# Patient Record
Sex: FEMALE | Employment: FULL TIME | URBAN - METROPOLITAN AREA
[De-identification: names, ages, dates, MRNs, and addresses within clinical notes are randomized per-mention and may not be internally consistent; named-entity substitution may affect disease eponyms.]

---

## 2018-01-29 ENCOUNTER — OFFICE VISIT (OUTPATIENT)
Dept: URGENT CARE | Facility: CLINIC | Age: 48
End: 2018-01-29
Payer: COMMERCIAL

## 2018-01-29 VITALS
HEART RATE: 72 BPM | WEIGHT: 155 LBS | BODY MASS INDEX: 24.91 KG/M2 | SYSTOLIC BLOOD PRESSURE: 153 MMHG | HEIGHT: 66 IN | TEMPERATURE: 99 F | RESPIRATION RATE: 18 BRPM | OXYGEN SATURATION: 100 % | DIASTOLIC BLOOD PRESSURE: 80 MMHG

## 2018-01-29 DIAGNOSIS — R73.9 BLOOD GLUCOSE ELEVATED: ICD-10-CM

## 2018-01-29 DIAGNOSIS — R40.20 LOSS OF CONSCIOUSNESS: Primary | ICD-10-CM

## 2018-01-29 LAB
BILIRUB UR QL STRIP: NEGATIVE
GLUCOSE SERPL-MCNC: 123 MG/DL (ref 70–110)
GLUCOSE UR QL STRIP: NEGATIVE
KETONES UR QL STRIP: NEGATIVE
LEUKOCYTE ESTERASE UR QL STRIP: NEGATIVE
PH, POC UA: 5.5 (ref 5–8)
POC ANION GAP: 16 MMOL/L (ref 10–20)
POC BLOOD, URINE: POSITIVE
POC BUN: 15 MMOL/L (ref 8–26)
POC CHLORIDE: 103 MMOL/L (ref 98–109)
POC CREATININE: 0.7 MG/DL (ref 0.6–1.3)
POC HEMATOCRIT: 43 %PCV (ref 37–47)
POC HEMOGLOBIN: 14.6 G/DL (ref 12.5–16)
POC ICA: 1.15 MMOL/L (ref 1.12–1.32)
POC NITRATES, URINE: NEGATIVE
POC POTASSIUM: 4.2 MMOL/L (ref 3.5–4.9)
POC SODIUM: 137 MMOL/L (ref 138–146)
POC TCO2: 24 MMOL/L (ref 24–29)
PROT UR QL STRIP: NEGATIVE
SP GR UR STRIP: 1.01 (ref 1–1.03)
UROBILINOGEN UR STRIP-ACNC: NORMAL (ref 0.1–1.1)

## 2018-01-29 PROCEDURE — 81003 URINALYSIS AUTO W/O SCOPE: CPT | Mod: QW,S$GLB,, | Performed by: EMERGENCY MEDICINE

## 2018-01-29 PROCEDURE — 99203 OFFICE O/P NEW LOW 30 MIN: CPT | Mod: 25,S$GLB,, | Performed by: EMERGENCY MEDICINE

## 2018-01-29 PROCEDURE — 80047 BASIC METABLC PNL IONIZED CA: CPT | Mod: QW,S$GLB,, | Performed by: EMERGENCY MEDICINE

## 2018-01-29 NOTE — PROGRESS NOTES
"Subjective:       Patient ID: Liz Barragan is a 47 y.o. female.    Vitals:  height is 5' 6" (1.676 m) and weight is 70.3 kg (155 lb). Her oral temperature is 98.6 °F (37 °C). Her blood pressure is 153/80 (abnormal) and her pulse is 72. Her respiration is 18 and oxygen saturation is 100%.     Chief Complaint: Dizziness    PATIENT PRESENTS TODAY WITH C/O PASSING OUT 2 DAYS AGO. PATIENT STATES THAT SHE WAS AT A [employer] Saturday NIGHT AND BEGAN TO FEEL HOT. SHE THEN PASSED OUT. SHE DENIES HITTING HER HEAD. SHE STATES THAT SHE DID LOSE CONSCIOUSNESS FOR LESS THAN 20 SEC. DENIES CHEST PAIN, SHORTNESS OF BREATH, BLURRED VISION, NUMBNESS OR TINGLING IN EXTREMITIES, COUGH, CONGESTION, FEVER, ABDOMINAL PAIN, N/V/D.     PATIENT REPORTS A HISTORY OF A HEART MURMUR. SHE WAS DIAGNOSED AT 26 YEARS OLD. SHE IS NO LONGER FOLLOWED FOR THIS. SHE IS UNSURE ABOUT WHAT TYPE OF MURMUR OF WHAT CAUSED It. THE MURMUR WAS FOUND BY HER PCP ON HEALTH SCREEN.      PATIENT STATES THAT SHE HAS FELT FINE SINCE Saturday. SHE WENT TO THE GYM YESTERDAY.      Dizziness:   Chronicity:  New  Onset:  In the past 7 days  Progression since onset:  Rapidly improving  Frequency - weeks/days included: Rear.  Pain Scale:  0/10  Severity:  Mild  Duration:  5 minutes  Dizziness characteristics:  Blacking out  Time frame: Rear.no fever, no headaches, no nausea, no vomiting and no chest pain.  Aggravated by:  Nothing  Risk factors:  Family history of inner ear  Treatments tried:  Nothing  Improvement on treatment: No treatment.    Review of Systems   Constitution: Negative for chills and fever.   HENT: Negative for sore throat.    Eyes: Negative for blurred vision.   Cardiovascular: Negative for chest pain.   Respiratory: Negative for shortness of breath.    Skin: Negative for rash.   Musculoskeletal: Negative for back pain and joint pain.   Gastrointestinal: Negative for abdominal pain, diarrhea, nausea and vomiting.   Neurological: Positive for dizziness. " Negative for headaches.   Psychiatric/Behavioral: The patient is not nervous/anxious.        Objective:      Physical Exam   Constitutional: She is oriented to person, place, and time. Vital signs are normal. She appears well-developed and well-nourished. She is cooperative.  Non-toxic appearance. She does not appear ill. No distress.   NAD     HENT:   Head: Normocephalic and atraumatic.   Right Ear: Hearing, tympanic membrane, external ear and ear canal normal.   Left Ear: Hearing, tympanic membrane, external ear and ear canal normal.   Nose: Nose normal. No mucosal edema, rhinorrhea or nasal deformity. No epistaxis. Right sinus exhibits no maxillary sinus tenderness and no frontal sinus tenderness. Left sinus exhibits no maxillary sinus tenderness and no frontal sinus tenderness.   Mouth/Throat: Uvula is midline, oropharynx is clear and moist and mucous membranes are normal. No trismus in the jaw. Normal dentition. No uvula swelling. No posterior oropharyngeal erythema.   Eyes: Conjunctivae and lids are normal. Right eye exhibits no discharge. Left eye exhibits no discharge. No scleral icterus.   Sclera clear bilat   Neck: Trachea normal, normal range of motion, full passive range of motion without pain and phonation normal. Neck supple.   Cardiovascular: Normal rate, regular rhythm, normal heart sounds, intact distal pulses and normal pulses.    No murmur heard.  Pulmonary/Chest: Effort normal and breath sounds normal. No respiratory distress.   Abdominal: Soft. Normal appearance and bowel sounds are normal. She exhibits no distension, no pulsatile midline mass and no mass. There is no tenderness.   Musculoskeletal: Normal range of motion. She exhibits no edema or deformity.   Neurological: She is alert and oriented to person, place, and time. She exhibits normal muscle tone. Coordination normal.   Skin: Skin is warm, dry and intact. She is not diaphoretic. No pallor.   Psychiatric: She has a normal mood and  affect. Her speech is normal and behavior is normal. Judgment and thought content normal. Cognition and memory are normal.   Nursing note and vitals reviewed.      Office Visit on 01/29/2018   Component Date Value Ref Range Status    POC Blood, Urine 01/29/2018 Positive* Negative Final    POC Bilirubin, Urine 01/29/2018 Negative  Negative Final    POC Urobilinogen, Urine 01/29/2018 Normal  0.1 - 1.1 Final    POC Ketones, Urine 01/29/2018 Negative  Negative Final    POC Protein, Urine 01/29/2018 Negative  Negative Final    POC Nitrates, Urine 01/29/2018 Negative  Negative Final    POC Glucose, Urine 01/29/2018 Negative  Negative Final    pH, UA 01/29/2018 5.5  5 - 8 Final    POC Specific Gravity, Urine 01/29/2018 1.010  1.003 - 1.029 Final    POC Leukocytes, Urine 01/29/2018 Negative  Negative Final    POC Sodium 01/29/2018 137  138 - 146 MMOL/L Final    POC Potassium 01/29/2018 4.2  3.5 - 4.9 MMOL/L Final    POC Chloride 01/29/2018 103  98 - 109 MMOL/L Final    POC BUN 01/29/2018 15  8 - 26 MMOL/L Final    POC Glucose 01/29/2018 123* 70 - 110 mg/dL Final    POC Creatinine 01/29/2018 0.7  0.6 - 1.3 mg/dL Final    POC iCA 01/29/2018 1.15  1.12 - 1.32 MMOL/L Final    POC TCO2 01/29/2018 24  24 - 29 MMOL/L Final    POC Hematocrit 01/29/2018 43  37 - 47 %PCV Final    POC Hemoglobin 01/29/2018 14.6  12.5 - 16 g/dL Final    POC Anion Gap 01/29/2018 16  10.0 - 20 MMOL/L Final     Assessment:       1. Loss of consciousness    2. Blood glucose elevated        Plan:       PATIENT DEFERRED CT SCAN OF HEAD AT THIS TIME  Loss of consciousness  -     POCT Urinalysis, Dipstick, Automated, W/O Scope  -     EKG 12-lead; Future  -     POCT Chemistry Panel (Manual)  -     Ambulatory referral to Internal Medicine    Blood glucose elevated      Patient Instructions     YOUR LAB TESTS AND EKG WERE NORMAL TODAY EXCEPT FOR ANY ELEVATED GLUCOSE  PLEASE FOLLOW UP WITH INTERNAL MEDICINE AS WE HAVE SET UP AN APPOINTMENT FOR  YOU TO ASSESS YOUR ELEVATED GLUCOSE AND HISTORY OF HEART MURMUR  IF THIS HAPPENS AGAIN PLEASE GO TO THE EMERGENCY ROOM  SEE HYPERGLYCEMIA SHEET  SEE ALTERED LOC SHEET    Altered Level of Consciousness  Level of consciousness (LOC) is a measure of a persons ability to interact with other people and to react to the surroundings. A person with an altered level of consciousness may not respond to touch or voices. He or she may look vacant or blank and may not make eye contact with others. He or she may be limp and may not move for a long time or show little interest in moving. He or she may also be confused.  There are many causes of altered LOC. They include low blood sugar, infection, medicines, injuries, or other medical problems.  Altered LOC is a medical emergency. The healthcare provider will do tests to help find the cause. These may include blood tests and imaging tests. The person is treated so breathing and heart rate are stable. An intravenous (IV) line may be put into a vein in the arm or hand to give medicines. Once the cause of altered LOC is found, the goal is to treat the cause.  In almost all cases, the person will be admitted to the hospital for observation.  Home care  When your loved one is released from the hospital, you will be given guidelines for caring for him or her. In general:  · Follow the healthcare provider's instructions for giving any prescribed medicines to your child.  · Stay with your loved one or have another responsible adult look after him or her. Watch carefully for any return of symptoms or changes in behavior.  · If the person has diabetes, make sure that any approved medicines are given on time and as prescribed.  Follow-up care  Follow up with the healthcare provider or our staff.  When to seek medical advice  Call the healthcare provider right away for any of the following:  · Symptoms of altered LOC return  · New symptoms appear  Date Last Reviewed: 8/23/2015  © 0475-3533  [employer]. [address] [place] [address], [place] [address]. All rights reserved. This information is not intended as a substitute for professional medical care. Always follow your healthcare professional's instructions.        High Blood Sugar (Hyperglycemia)     When you have hyperglycemia, drink plenty of water or other sugar-free, caffeine-free liquids.   Too much glucose (sugar) in your blood is called hyperglycemia or high blood sugar. High blood sugar can lead to a dangerous condition called ketoacidosis. In severe cases, it can lead to dehydration and coma.  Possible causes of hyperglycemia  · Inadequate treatment plan for diabetes   · Being sick  · Being under stress  · Taking certain medicines, such as steroids  · Eating too much food, especially carbohydrates  · Being less active than usual  · Not taking enough diabetes medicine  Symptoms of hyperglycemia  Hyperglycemia may not cause symptoms. If you do have symptoms, they may include:  · Thirst  · Frequent need to urinate  · Feeling tired  · Nausea and vomiting  · Itchy, dry skin  · Blurry vision  · Fast breathing and breath that smells fruity   · Weakness  · Dizziness  · Wounds or skin infections that dont heal  · Unexplained weight loss if hyperglycemia lasts for more than a few days   What you should do  Make sure you do the following:  · Check your blood sugar.  · Drink plenty of sugar-free, caffeine-free liquids such as water. Dont drink fruit juice.  · Check your blood sugar again every 4 hours. If you take insulin or diabetes medicines, follow your sick-day plan for taking medicine. Call your healthcare provider if you are not able to eat.  · Check your blood or urine for ketones as directed.  · Call your healthcare provider if your blood sugar and ketones do not return to your target range.  Preventing high blood sugar  To help keep your blood sugar from getting too high:  · Control stress.  · When you're ill, follow your sick-day  plan.   · Follow your meal plan. Eat only the amount of food on your meal plan  · Follow your exercise plan.  · Take your insulin or diabetes medicines as directed by your healthcare team. Also test your blood sugar as directed. If the plan is not working for you, discuss it with your healthcare provider.  Other things to do  · Carry a medical ID card, a compact USB drive, or wear a medical alert bracelet or necklace. It should say that you have diabetes. It should also say what to do in case you pass out or go into a coma.  · Make sure family, friends, and coworkers know the signs of high blood sugar. Tell them what to do if your blood sugar gets very high and you cant help yourself.  · Talk to your healthcare team about other things you can do to prevent high blood sugar.   Special note: Drink plenty of sugar-free and caffeine-free liquids when you feel symptoms of hyperglycemia. Call your healthcare provider if you keep having episodes of hyperglycemia.   Date Last Reviewed: 5/1/2016 © 2000-2017 [employer], "[employer]. [address] [place] [address], [place] [address]. All rights reserved. This information is not intended as a substitute for professional medical care. Always follow your healthcare professional's instructions.

## 2018-01-29 NOTE — PATIENT INSTRUCTIONS
YOUR LAB TESTS AND EKG WERE NORMAL TODAY EXCEPT FOR ANY ELEVATED GLUCOSE  PLEASE FOLLOW UP WITH INTERNAL MEDICINE AS WE HAVE SET UP AN APPOINTMENT FOR YOU TO ASSESS YOUR ELEVATED GLUCOSE AND HISTORY OF HEART MURMUR  IF THIS HAPPENS AGAIN PLEASE GO TO THE EMERGENCY ROOM  SEE HYPERGLYCEMIA SHEET  SEE ALTERED LOC SHEET    Altered Level of Consciousness  Level of consciousness (LOC) is a measure of a persons ability to interact with other people and to react to the surroundings. A person with an altered level of consciousness may not respond to touch or voices. He or she may look vacant or blank and may not make eye contact with others. He or she may be limp and may not move for a long time or show little interest in moving. He or she may also be confused.  There are many causes of altered LOC. They include low blood sugar, infection, medicines, injuries, or other medical problems.  Altered LOC is a medical emergency. The healthcare provider will do tests to help find the cause. These may include blood tests and imaging tests. The person is treated so breathing and heart rate are stable. An intravenous (IV) line may be put into a vein in the arm or hand to give medicines. Once the cause of altered LOC is found, the goal is to treat the cause.  In almost all cases, the person will be admitted to the hospital for observation.  Home care  When your loved one is released from the hospital, you will be given guidelines for caring for him or her. In general:  · Follow the healthcare provider's instructions for giving any prescribed medicines to your child.  · Stay with your loved one or have another responsible adult look after him or her. Watch carefully for any return of symptoms or changes in behavior.  · If the person has diabetes, make sure that any approved medicines are given on time and as prescribed.  Follow-up care  Follow up with the healthcare provider or our staff.  When to seek medical advice  Call the  healthcare provider right away for any of the following:  · Symptoms of altered LOC return  · New symptoms appear  Date Last Reviewed: 8/23/2015  © 4237-7532 [employer]. [address] [place] [address], [place] [address]. All rights reserved. This information is not intended as a substitute for professional medical care. Always follow your healthcare professional's instructions.        High Blood Sugar (Hyperglycemia)     When you have hyperglycemia, drink plenty of water or other sugar-free, caffeine-free liquids.   Too much glucose (sugar) in your blood is called hyperglycemia or high blood sugar. High blood sugar can lead to a dangerous condition called ketoacidosis. In severe cases, it can lead to dehydration and coma.  Possible causes of hyperglycemia  · Inadequate treatment plan for diabetes   · Being sick  · Being under stress  · Taking certain medicines, such as steroids  · Eating too much food, especially carbohydrates  · Being less active than usual  · Not taking enough diabetes medicine  Symptoms of hyperglycemia  Hyperglycemia may not cause symptoms. If you do have symptoms, they may include:  · Thirst  · Frequent need to urinate  · Feeling tired  · Nausea and vomiting  · Itchy, dry skin  · Blurry vision  · Fast breathing and breath that smells fruity   · Weakness  · Dizziness  · Wounds or skin infections that dont heal  · Unexplained weight loss if hyperglycemia lasts for more than a few days   What you should do  Make sure you do the following:  · Check your blood sugar.  · Drink plenty of sugar-free, caffeine-free liquids such as water. Dont drink fruit juice.  · Check your blood sugar again every 4 hours. If you take insulin or diabetes medicines, follow your sick-day plan for taking medicine. Call your healthcare provider if you are not able to eat.  · Check your blood or urine for ketones as directed.  · Call your healthcare provider if your blood sugar and ketones do not return to your  target range.  Preventing high blood sugar  To help keep your blood sugar from getting too high:  · Control stress.  · When you're ill, follow your sick-day plan.   · Follow your meal plan. Eat only the amount of food on your meal plan  · Follow your exercise plan.  · Take your insulin or diabetes medicines as directed by your healthcare team. Also test your blood sugar as directed. If the plan is not working for you, discuss it with your healthcare provider.  Other things to do  · Carry a medical ID card, a compact USB drive, or wear a medical alert bracelet or necklace. It should say that you have diabetes. It should also say what to do in case you pass out or go into a coma.  · Make sure family, friends, and coworkers know the signs of high blood sugar. Tell them what to do if your blood sugar gets very high and you cant help yourself.  · Talk to your healthcare team about other things you can do to prevent high blood sugar.   Special note: Drink plenty of sugar-free and caffeine-free liquids when you feel symptoms of hyperglycemia. Call your healthcare provider if you keep having episodes of hyperglycemia.   Date Last Reviewed: 5/1/2016  © 8425-9237 [facility]. [address] [place] [address], [place] [address]. All rights reserved. This information is not intended as a substitute for professional medical care. Always follow your healthcare professional's instructions.